# Patient Record
Sex: FEMALE | Race: WHITE | NOT HISPANIC OR LATINO | URBAN - METROPOLITAN AREA
[De-identification: names, ages, dates, MRNs, and addresses within clinical notes are randomized per-mention and may not be internally consistent; named-entity substitution may affect disease eponyms.]

---

## 2020-07-13 ENCOUNTER — OFFICE VISIT (OUTPATIENT)
Dept: URBAN - METROPOLITAN AREA TELEHEALTH 2 | Facility: TELEHEALTH | Age: 50
End: 2020-07-13

## 2020-07-13 RX ORDER — BUPROPION HYDROCHLORIDE 200 MG/1
TABLET, EXTENDED RELEASE ORAL
Qty: 0 | Refills: 0 | Status: ACTIVE | COMMUNITY
Start: 2017-06-13

## 2020-07-13 RX ORDER — DIAZEPAM 5 MG/1
TABLET ORAL
Qty: 0 | Refills: 0 | Status: ACTIVE | COMMUNITY
Start: 2017-06-28

## 2020-07-13 RX ORDER — IBUPROFEN 600 MG/1
TABLET ORAL
Qty: 0 | Refills: 0 | Status: ON HOLD | COMMUNITY
Start: 2017-06-28

## 2020-07-13 RX ORDER — ESCITALOPRAM 20 MG/1
TABLET, FILM COATED ORAL
Qty: 0 | Refills: 0 | Status: ACTIVE | COMMUNITY
Start: 2017-06-28

## 2020-07-20 ENCOUNTER — OFFICE VISIT (OUTPATIENT)
Dept: URBAN - METROPOLITAN AREA TELEHEALTH 2 | Facility: TELEHEALTH | Age: 50
End: 2020-07-20
Payer: COMMERCIAL

## 2020-07-20 DIAGNOSIS — K76.0 FATTY LIVER: ICD-10-CM

## 2020-07-20 DIAGNOSIS — E66.9 OBESITY (BMI 30-39.9): ICD-10-CM

## 2020-07-20 DIAGNOSIS — E78.5 HYPERLIPIDEMIA: ICD-10-CM

## 2020-07-20 DIAGNOSIS — F41.9 ANXIETY: ICD-10-CM

## 2020-07-20 DIAGNOSIS — K21.9 GERD: ICD-10-CM

## 2020-07-20 DIAGNOSIS — Z86.010 HISTORY OF COLON POLYPS: ICD-10-CM

## 2020-07-20 PROCEDURE — 99203 OFFICE O/P NEW LOW 30 MIN: CPT | Performed by: INTERNAL MEDICINE

## 2020-07-20 PROCEDURE — 99213 OFFICE O/P EST LOW 20 MIN: CPT | Performed by: INTERNAL MEDICINE

## 2020-07-20 RX ORDER — BUPROPION HYDROCHLORIDE 200 MG/1
TABLET, EXTENDED RELEASE ORAL
Qty: 0 | Refills: 0 | Status: ACTIVE | COMMUNITY
Start: 2017-06-13

## 2020-07-20 RX ORDER — IBUPROFEN 600 MG/1
TABLET ORAL
Qty: 0 | Refills: 0 | Status: ON HOLD | COMMUNITY
Start: 2017-06-28

## 2020-07-20 RX ORDER — DIAZEPAM 5 MG/1
TABLET ORAL
Qty: 0 | Refills: 0 | Status: ACTIVE | COMMUNITY
Start: 2017-06-28

## 2020-07-20 RX ORDER — ESCITALOPRAM 20 MG/1
TABLET, FILM COATED ORAL
Qty: 0 | Refills: 0 | Status: ACTIVE | COMMUNITY
Start: 2017-06-28

## 2020-07-20 NOTE — HPI-TODAY'S VISIT:
The patient is a 50-year-old female who was referred by Dr. Penaloza for further care of a fatty liver.  She was found to have mildly elevated liver enzymes with an AST of 42 ALT 53 and an alkaline phosphatase of 155.  Total bilirubin and albumin were in the normal range.  Other labs were remarkable for a cholesterol of 311 triglyceride level of 479.  Her hepatitis a antibody (IgM) was negative, hepatitis B surface antigen was negative.  Hepatitis B core antibody, IgM was negative.  Hepatitis C antibody was negative.  An ultrasound revealed a fatty liver.  She denies any significant alcohol intake.  Her BMI is 32.5.  The patient has begun to diet although has not lost much weight yet. The patient also notes having had a colonoscopy elsewhere in 2018 which revealed 5 polyps, 1 of which was is large enough to need a clip by her description.  She recalls being told a 10-year follow-up was appropriate, but will obtain these records for my review. The patient reports mild gastroesophageal reflux symptoms which are well controlled at present on Nexium.

## 2021-01-06 ENCOUNTER — DASHBOARD ENCOUNTERS (OUTPATIENT)
Age: 51
End: 2021-01-06

## 2021-01-07 ENCOUNTER — OFFICE VISIT (OUTPATIENT)
Dept: URBAN - METROPOLITAN AREA CLINIC 109 | Facility: CLINIC | Age: 51
End: 2021-01-07